# Patient Record
Sex: FEMALE | Race: WHITE | NOT HISPANIC OR LATINO | URBAN - METROPOLITAN AREA
[De-identification: names, ages, dates, MRNs, and addresses within clinical notes are randomized per-mention and may not be internally consistent; named-entity substitution may affect disease eponyms.]

---

## 2023-04-14 ENCOUNTER — TELEMEDICINE (OUTPATIENT)
Dept: PEDIATRICS | Facility: CLINIC | Age: 18
End: 2023-04-14
Payer: COMMERCIAL

## 2023-04-14 DIAGNOSIS — J01.90 ACUTE NON-RECURRENT SINUSITIS, UNSPECIFIED LOCATION: Primary | ICD-10-CM

## 2023-04-14 PROCEDURE — 99213 OFFICE O/P EST LOW 20 MIN: CPT | Performed by: PEDIATRICS

## 2023-04-14 RX ORDER — AMOXICILLIN AND CLAVULANATE POTASSIUM 875; 125 MG/1; MG/1
1 TABLET, FILM COATED ORAL 2 TIMES DAILY
Qty: 20 TABLET | Refills: 0 | Status: SHIPPED | OUTPATIENT
Start: 2023-04-14 | End: 2023-04-24

## 2023-04-14 ASSESSMENT — ENCOUNTER SYMPTOMS
VOMITING: 1
RHINORRHEA: 1
COUGH: 1
SINUS PAIN: 1
DIARRHEA: 0
HEADACHES: 1
SORE THROAT: 1

## 2023-04-14 NOTE — PROGRESS NOTES
Subjective   Yashira Ruiz is a 17 y.o. female who presents for URI and  Cough.    Virtual Visit.    URI   This is a chronic problem. Episode onset: started 2 weeks ago - she thought it was allergies at first. The problem has been rapidly worsening. Maximum temperature: not measured but her pjs were wet this am. Associated symptoms include chest pain, congestion, coughing, ear pain, headaches, rhinorrhea, sinus pain, sneezing, a sore throat and vomiting. Pertinent negatives include no diarrhea. Associated symptoms comments: +yellow nasal discharge.. She has tried acetaminophen and NSAIDs for the symptoms. The treatment provided mild relief.       Objective     There were no vitals taken for this visit.    Physical Exam  Constitutional:       Comments: She actually looks pretty pathetic   HENT:      Head: Normocephalic.      Nose: Nose normal.      Mouth/Throat:      Mouth: Mucous membranes are moist.      Pharynx: No oropharyngeal exudate or posterior oropharyngeal erythema.   Pulmonary:      Effort: Pulmonary effort is normal.   Musculoskeletal:      Cervical back: Normal range of motion.   Neurological:      Mental Status: She is alert and oriented to person, place, and time.   Psychiatric:         Mood and Affect: Mood normal.         Yashira was seen today for cough.  Diagnoses and all orders for this visit:  Acute non-recurrent sinusitis, unspecified location (Primary)  -     amoxicillin-pot clavulanate (Augmentin) 875-125 mg tablet; Take 1 tablet (875 mg) by mouth in the morning and 1 tablet (875 mg) before bedtime. Do all this for 10 days.  Yashira meets the time and symptom criteria for acute sinusitis.  She should take abx as directed.  If she gets worse she needs to be seen in person.  Push fluids, get rest.   An interactive audio and video telecommunication system which permits real time communication between the patient (at the originating site) and provider (at the distant site) was utilized to provide  this telehealth service.

## 2023-06-29 VITALS
HEIGHT: 65 IN | WEIGHT: 112.4 LBS | SYSTOLIC BLOOD PRESSURE: 107 MMHG | BODY MASS INDEX: 18.73 KG/M2 | HEART RATE: 67 BPM | DIASTOLIC BLOOD PRESSURE: 73 MMHG

## 2023-06-29 PROBLEM — F32.A DEPRESSIVE DISORDER: Status: ACTIVE | Noted: 2022-01-04

## 2023-06-29 PROBLEM — Z91.09 ALLERGY TO ENVIRONMENTAL FACTORS: Status: ACTIVE | Noted: 2018-07-12

## 2023-06-29 PROBLEM — B37.31 CANDIDIASIS OF VAGINA: Status: ACTIVE | Noted: 2022-01-04

## 2023-06-29 PROBLEM — F41.1 GENERALIZED ANXIETY DISORDER: Status: ACTIVE | Noted: 2022-01-04

## 2023-06-29 PROBLEM — N39.0 URINARY TRACT INFECTIOUS DISEASE: Status: ACTIVE | Noted: 2022-06-09

## 2023-06-29 PROBLEM — E55.9 VITAMIN D DEFICIENCY: Status: ACTIVE | Noted: 2020-12-09

## 2023-06-29 PROBLEM — H52.31 ANISOMETROPIA: Status: ACTIVE | Noted: 2019-06-19

## 2023-06-29 RX ORDER — FLUOXETINE 20 MG/1
20 TABLET ORAL DAILY
COMMUNITY
End: 2023-07-06 | Stop reason: ALTCHOICE

## 2023-07-03 ENCOUNTER — APPOINTMENT (OUTPATIENT)
Dept: PEDIATRICS | Facility: CLINIC | Age: 18
End: 2023-07-03
Payer: COMMERCIAL

## 2023-07-05 NOTE — PROGRESS NOTES
"Subjective   History was provided by Yashira.  Yashira Ruiz is a 17 y.o. female who is here for this well visit.      Current Issues:  Current concerns: gets abdominal pain after AM [facility] work out.  Sometimes has D.    Vision or hearing concerns? no  Dental care up to date? Yes- brushes teeth 2 times/day , regular dental visits , does floss teeth   No significant recent health issues.   No Specialist visits.      Review of Nutrition, Elimination, and Sleep:  Current diet:  3 meals/day , well balanced diet , normal portions , <8oz. sugar containing beverages daily , appropriate dairy intake , diet includes fruits and vegetables and protein.  Elimination: normal bowel movement frequency, normal consistency   Sleep: has structured bedtime routine , sleeps through the night , no trouble getting up    School and Social Screening:  School: [employer]  Work: at [employer] course this summer  Activities: [facility]  Supportive social network. Current relationship - none.     Sports Participation Screening:  Gets regular exercise.    Screening Questions:  Other: normal mood, satisfied with body weight  Risk factors for dyslipidemia: no  Risk factors for sexually-transmitted infections:   Sexually active: yes - not currently    Using condoms: Yes  Substance use:  Smoking - No  Vaping - No  Drinking - No  Drugs - No  Genitourinary:  normal menses - no issues. Was on ocp (some nausea but no other issues) but it ran out early June. + has gotten period.     Objective   /78 (BP Location: Left arm, Patient Position: Sitting, BP Cuff Size: Adult)   Pulse 68   Ht 1.62 m (5' 3.78\")   Wt 54.3 kg (119 lb 12.8 oz)   BMI 20.71 kg/m²   Growth parameters are noted and are appropriate for age.    Physical Exam  Constitutional:       Appearance: Normal appearance.   HENT:      Right Ear: Tympanic membrane normal.      Left Ear: Tympanic membrane normal.      Nose: Nose normal.      Mouth/Throat:      Mouth: Mucous membranes are " moist.      Pharynx: Oropharynx is clear.   Eyes:      Extraocular Movements: Extraocular movements intact.   Cardiovascular:      Rate and Rhythm: Normal rate and regular rhythm.      Pulses:           Femoral pulses are 2+ on the right side and 2+ on the left side.     Heart sounds: No murmur heard.  Pulmonary:      Effort: Pulmonary effort is normal.      Breath sounds: Normal breath sounds.   Chest:   Breasts:     Edward Score is 5.      Breasts are symmetrical.   Abdominal:      General: Abdomen is flat.      Palpations: Abdomen is soft. There is no hepatomegaly, splenomegaly or mass.   Genitourinary:     Comments: Pt declines exam  Musculoskeletal:         General: Normal range of motion.      Cervical back: Normal range of motion and neck supple.      Thoracic back: No scoliosis.      Lumbar back: No scoliosis.   Lymphadenopathy:      Cervical: No cervical adenopathy.   Skin:     General: Skin is warm.      Findings: No acne.   Neurological:      General: No focal deficit present.      Mental Status: She is alert.      Deep Tendon Reflexes:      Reflex Scores:       Patellar reflexes are 2+ on the right side and 2+ on the left side.  Psychiatric:         Mood and Affect: Mood normal.         Behavior: Behavior normal.         Assessment/Plan   Yashira was seen today for well child.  Diagnoses and all orders for this visit:  Wellness examination (Primary)  -     norgestimate-ethinyl estradioL (Ortho Tri-Cyclen,Trinessa) 0.18/0.215/0.25 mg-35 mcg (28) tablet; Take 1 tablet by mouth once daily.  Lipid screening  -     Lipid Panel; Future  Screening procedure  -     C. Trachomatis / N. Gonorrhoeae, Amplified Detection  Other orders  -     1 Year Follow Up In Pediatrics; Future    Well young adult.  - Anticipatory guidance discussed.   - Injury prevention: wearing seatbelt , understanding sun protection , understanding conflict resolution/violence prevention,  reviewed driving safety    -Risk Taking: cardiac risk  factors reviewed , alcohol, drug and tobacco use reviewed , reviewed internet safety      - Growth and weight gain appropriate. The patient was counseled regarding nutrition and physical activity.  - Development: appropriate for age  - No Immunizations today.  -I will contact Sports Med in regards to abdominal pain.   - Follow up in 1 year for next well child exam or sooner with concerns.

## 2023-07-06 ENCOUNTER — LAB (OUTPATIENT)
Dept: LAB | Facility: LAB | Age: 18
End: 2023-07-06
Payer: COMMERCIAL

## 2023-07-06 ENCOUNTER — OFFICE VISIT (OUTPATIENT)
Dept: PEDIATRICS | Facility: CLINIC | Age: 18
End: 2023-07-06
Payer: COMMERCIAL

## 2023-07-06 VITALS
BODY MASS INDEX: 20.45 KG/M2 | HEIGHT: 64 IN | SYSTOLIC BLOOD PRESSURE: 113 MMHG | WEIGHT: 119.8 LBS | HEART RATE: 68 BPM | DIASTOLIC BLOOD PRESSURE: 78 MMHG

## 2023-07-06 DIAGNOSIS — Z13.220 LIPID SCREENING: ICD-10-CM

## 2023-07-06 DIAGNOSIS — Z00.00 WELLNESS EXAMINATION: Primary | ICD-10-CM

## 2023-07-06 DIAGNOSIS — Z13.9 SCREENING PROCEDURE: ICD-10-CM

## 2023-07-06 PROBLEM — B37.31 CANDIDIASIS OF VAGINA: Status: RESOLVED | Noted: 2022-01-04 | Resolved: 2023-07-06

## 2023-07-06 PROBLEM — N39.0 URINARY TRACT INFECTIOUS DISEASE: Status: RESOLVED | Noted: 2022-06-09 | Resolved: 2023-07-06

## 2023-07-06 LAB
CHOLESTEROL (MG/DL) IN SER/PLAS: 139 MG/DL (ref 0–199)
CHOLESTEROL IN HDL (MG/DL) IN SER/PLAS: 54.9 MG/DL
CHOLESTEROL/HDL RATIO: 2.5
LDL: 76 MG/DL (ref 0–109)
NON HDL CHOLESTEROL: 84 MG/DL (ref 0–119)
TRIGLYCERIDE (MG/DL) IN SER/PLAS: 43 MG/DL (ref 0–149)
VLDL: 9 MG/DL (ref 0–40)

## 2023-07-06 PROCEDURE — 80061 LIPID PANEL: CPT

## 2023-07-06 PROCEDURE — 99395 PREV VISIT EST AGE 18-39: CPT | Performed by: PEDIATRICS

## 2023-07-06 PROCEDURE — 87491 CHLMYD TRACH DNA AMP PROBE: CPT

## 2023-07-06 PROCEDURE — 87591 N.GONORRHOEAE DNA AMP PROB: CPT

## 2023-07-06 PROCEDURE — 3008F BODY MASS INDEX DOCD: CPT | Performed by: PEDIATRICS

## 2023-07-06 PROCEDURE — 96127 BRIEF EMOTIONAL/BEHAV ASSMT: CPT | Performed by: PEDIATRICS

## 2023-07-06 PROCEDURE — 1036F TOBACCO NON-USER: CPT | Performed by: PEDIATRICS

## 2023-07-06 PROCEDURE — 36415 COLL VENOUS BLD VENIPUNCTURE: CPT

## 2023-07-06 RX ORDER — NORGESTIMATE AND ETHINYL ESTRADIOL 7DAYSX3 28
1 KIT ORAL DAILY
Qty: 84 TABLET | Refills: 3 | Status: SHIPPED | OUTPATIENT
Start: 2023-07-06 | End: 2024-01-20 | Stop reason: ALTCHOICE

## 2023-07-06 RX ORDER — NORGESTIMATE AND ETHINYL ESTRADIOL 7DAYSX3 28
1 KIT ORAL DAILY
COMMUNITY
Start: 2023-05-04 | End: 2023-07-06 | Stop reason: SDUPTHER

## 2023-07-07 LAB
CHLAMYDIA TRACH., AMPLIFIED: NEGATIVE
N. GONORRHEA, AMPLIFIED: NEGATIVE

## 2023-07-10 ENCOUNTER — TELEPHONE (OUTPATIENT)
Dept: PEDIATRICS | Facility: CLINIC | Age: 18
End: 2023-07-10
Payer: COMMERCIAL

## 2023-07-10 DIAGNOSIS — R52 PAIN AGGRAVATED BY EXERCISE: Primary | ICD-10-CM

## 2023-07-10 NOTE — TELEPHONE ENCOUNTER
What Yashira eats or drinks doesn't impact her symptoms after exercise.  Will refer to Sports Med.

## 2024-01-11 ENCOUNTER — TELEPHONE (OUTPATIENT)
Dept: PEDIATRICS | Facility: CLINIC | Age: 19
End: 2024-01-11
Payer: COMMERCIAL

## 2024-01-12 NOTE — TELEPHONE ENCOUNTER
Nausea started this AM and threw up between 8 and 9.  Has vomited 4 times since then.  No D.  No F.  Last vomit 1 hour ago. Small UO 30 min ago.  Had appendix out last summer.    Guidelines:  Vomiting due to an infection usually lasts 4 to 12 hours.  Offer 5 ml of clear fluid every 10 - 15 minutes.  For each hour they don't vomit the amount of fluid offered can be increased.  Once they haven't vomited for 4 hours the amount of fluids can be on demand - continue to offer regularly.  When they are tolerating full fluids and are hungry, bland solids can be offered.

## 2024-01-16 ENCOUNTER — TELEPHONE (OUTPATIENT)
Dept: PEDIATRICS | Facility: CLINIC | Age: 19
End: 2024-01-16
Payer: COMMERCIAL

## 2024-01-16 DIAGNOSIS — R10.2 PELVIC PAIN: Primary | ICD-10-CM

## 2024-01-16 NOTE — TELEPHONE ENCOUNTER
Per mom, was seen in ER for pelvic pain, put on antibiotics, was told to see gyne.  Needs referral.

## 2024-01-18 PROBLEM — S86.319A STRAIN OF PERONEAL TENDON: Status: ACTIVE | Noted: 2024-01-18

## 2024-01-18 PROBLEM — Q52.10 VAGINAL SEPTUM: Status: ACTIVE | Noted: 2024-01-18

## 2024-01-18 PROBLEM — S93.409A ANKLE SPRAIN: Status: ACTIVE | Noted: 2024-01-18

## 2024-01-18 PROBLEM — K35.80 APPENDICITIS, ACUTE: Status: ACTIVE | Noted: 2024-01-18

## 2024-01-18 RX ORDER — IBUPROFEN 400 MG/1
TABLET ORAL
COMMUNITY
End: 2024-01-20 | Stop reason: ALTCHOICE

## 2024-01-18 RX ORDER — LIDOCAINE HYDROCHLORIDE 20 MG/ML
SOLUTION OROPHARYNGEAL
COMMUNITY
Start: 2022-12-11 | End: 2024-03-11 | Stop reason: WASHOUT

## 2024-01-18 RX ORDER — ACETAMINOPHEN 325 MG/1
TABLET ORAL
COMMUNITY
Start: 2023-07-14 | End: 2024-01-20 | Stop reason: ALTCHOICE

## 2024-01-18 RX ORDER — AMOXICILLIN AND CLAVULANATE POTASSIUM 875; 125 MG/1; MG/1
TABLET, FILM COATED ORAL
COMMUNITY
Start: 2023-04-14 | End: 2024-01-20 | Stop reason: ALTCHOICE

## 2024-01-18 RX ORDER — DOXYCYCLINE HYCLATE 100 MG
100 TABLET ORAL 2 TIMES DAILY
COMMUNITY
Start: 2024-01-12 | End: 2024-01-26

## 2024-01-18 RX ORDER — OXYCODONE HYDROCHLORIDE 5 MG/1
5 TABLET ORAL EVERY 6 HOURS PRN
COMMUNITY
Start: 2023-07-16 | End: 2024-01-20 | Stop reason: ALTCHOICE

## 2024-01-18 RX ORDER — ONDANSETRON 8 MG/1
8 TABLET, ORALLY DISINTEGRATING ORAL EVERY 8 HOURS PRN
COMMUNITY
Start: 2024-01-12 | End: 2024-03-11 | Stop reason: WASHOUT

## 2024-01-20 ENCOUNTER — OFFICE VISIT (OUTPATIENT)
Dept: PEDIATRICS | Facility: CLINIC | Age: 19
End: 2024-01-20
Payer: COMMERCIAL

## 2024-01-20 VITALS — BODY MASS INDEX: 19.55 KG/M2 | WEIGHT: 113.8 LBS | TEMPERATURE: 97.7 F

## 2024-01-20 DIAGNOSIS — R10.30 LOWER ABDOMINAL PAIN: Primary | ICD-10-CM

## 2024-01-20 LAB
POC APPEARANCE, URINE: CLEAR
POC BILIRUBIN, URINE: NEGATIVE
POC BLOOD, URINE: NEGATIVE
POC COLOR, URINE: YELLOW
POC GLUCOSE, URINE: NEGATIVE MG/DL
POC KETONES, URINE: NEGATIVE MG/DL
POC LEUKOCYTES, URINE: NEGATIVE
POC NITRITE,URINE: NEGATIVE
POC PH, URINE: 5 PH
POC PROTEIN, URINE: NEGATIVE MG/DL
POC SPECIFIC GRAVITY, URINE: >=1.03
POC UROBILINOGEN, URINE: 1 EU/DL

## 2024-01-20 PROCEDURE — 81002 URINALYSIS NONAUTO W/O SCOPE: CPT | Performed by: PEDIATRICS

## 2024-01-20 PROCEDURE — 1036F TOBACCO NON-USER: CPT | Performed by: PEDIATRICS

## 2024-01-20 PROCEDURE — 99214 OFFICE O/P EST MOD 30 MIN: CPT | Performed by: PEDIATRICS

## 2024-01-20 ASSESSMENT — ENCOUNTER SYMPTOMS
DIARRHEA: 1
ABDOMINAL PAIN: 1
DYSURIA: 0
VOMITING: 0
CONSTIPATION: 0
NAUSEA: 1
FEVER: 0
ANOREXIA: 1

## 2024-01-20 NOTE — PROGRESS NOTES
Subjective   Yashira Ruiz is a 18 y.o. female who presents for Vomiting (Vomiting/Fever/Was diagnosed with inflamed cervix/pt stated antibiotics are not working/Here by herself).  Today she is accompanied by caregiver who is also providing history.    Abdominal Pain  This is a new problem. Episode onset: 9 days ago started with vomiting. The onset quality is sudden. Episode frequency: still with lower abdominal pain and urinary urgency and lower but no dysuria. The pain is located in the suprapubic region. Associated symptoms include anorexia, diarrhea and nausea. Pertinent negatives include no constipation, dysuria, fever (lasted 2 days) or vomiting (resolved 2-3 d ago). (Urinary urgency but doesn't go every time; no vaginal discharge the entire time) Nothing relieves the symptoms. Past treatments include nothing (immodium). The treatment provided mild relief. (Procedure history: the ER talked about doing an imaging procedure but didn't) Chronic gastrointestinal disease: Seen in ER 8 days ago.  Ultimately diagnosed with PID and rec'd ceftriaxone iv and doxy.  Developed swollen hands and feet 5 hours later..       Objective     Temp 36.5 °C (97.7 °F) (Tympanic)   Wt 51.6 kg (113 lb 12.8 oz)   BMI 19.55 kg/m²     Physical Exam  Vitals reviewed.   Constitutional:       Appearance: Normal appearance.   HENT:      Right Ear: Tympanic membrane normal.      Left Ear: Tympanic membrane normal.      Nose: Nose normal.      Mouth/Throat:      Mouth: Mucous membranes are moist.   Eyes:      Conjunctiva/sclera: Conjunctivae normal.   Cardiovascular:      Rate and Rhythm: Normal rate and regular rhythm.      Heart sounds: Normal heart sounds.   Pulmonary:      Effort: Pulmonary effort is normal.      Breath sounds: Normal breath sounds.   Abdominal:      General: Abdomen is flat. Bowel sounds are normal.      Palpations: Abdomen is soft. There is no mass.          Comments: Moderately tender in central lower area    Musculoskeletal:      Cervical back: Normal range of motion.   Skin:     General: Skin is warm.      Findings: No rash.   Neurological:      Mental Status: She is alert and oriented to person, place, and time.   Psychiatric:         Mood and Affect: Mood normal.         Assessment/Plan   Yashira was seen today for vomiting.  Diagnoses and all orders for this visit:  Lower abdominal pain (Primary)  -     POCT UA (nonautomated) manually resulted  -     Cancel: US pelvis; Future  -     US pelvis; Future  What seems most likely at this point is that Yashira had a significant AGE and that ongoing symptoms are related to antibiotics.  She has not had intercourse since September and had no symptoms and was negative for gc and CT this summer and labs came back negative from the ER.  This makes PID unlikely.  She clearly had a reaction to the ceftriaxone.  Since starting the antibiotics she has had diarrhea.  The current plan is to stop the antibiotics, increase fluids, and get an US in [place].  She should also set up a gyn appointment.  Obviously if she gets worse in any way she should be seen.

## 2024-01-21 ENCOUNTER — HOSPITAL ENCOUNTER (OUTPATIENT)
Dept: RADIOLOGY | Facility: HOSPITAL | Age: 19
Discharge: HOME | End: 2024-01-21
Payer: COMMERCIAL

## 2024-01-21 DIAGNOSIS — R10.30 LOWER ABDOMINAL PAIN: ICD-10-CM

## 2024-01-21 PROCEDURE — 76856 US EXAM PELVIC COMPLETE: CPT

## 2024-01-21 PROCEDURE — 76856 US EXAM PELVIC COMPLETE: CPT | Performed by: STUDENT IN AN ORGANIZED HEALTH CARE EDUCATION/TRAINING PROGRAM

## 2024-01-21 PROCEDURE — 76830 TRANSVAGINAL US NON-OB: CPT | Performed by: STUDENT IN AN ORGANIZED HEALTH CARE EDUCATION/TRAINING PROGRAM

## 2024-01-23 ENCOUNTER — TELEPHONE (OUTPATIENT)
Dept: OBSTETRICS AND GYNECOLOGY | Facility: CLINIC | Age: 19
End: 2024-01-23
Payer: COMMERCIAL

## 2024-03-11 ENCOUNTER — OFFICE VISIT (OUTPATIENT)
Dept: OBSTETRICS AND GYNECOLOGY | Facility: CLINIC | Age: 19
End: 2024-03-11
Payer: COMMERCIAL

## 2024-03-11 VITALS — SYSTOLIC BLOOD PRESSURE: 100 MMHG | BODY MASS INDEX: 19.75 KG/M2 | DIASTOLIC BLOOD PRESSURE: 58 MMHG | WEIGHT: 115 LBS

## 2024-03-11 DIAGNOSIS — N94.10 DYSPAREUNIA IN FEMALE: ICD-10-CM

## 2024-03-11 DIAGNOSIS — Z30.011 OCP (ORAL CONTRACEPTIVE PILLS) INITIATION: ICD-10-CM

## 2024-03-11 DIAGNOSIS — N94.6 DYSMENORRHEA: Primary | ICD-10-CM

## 2024-03-11 DIAGNOSIS — N94.2 VAGINISMUS: ICD-10-CM

## 2024-03-11 PROCEDURE — 99205 OFFICE O/P NEW HI 60 MIN: CPT | Performed by: OBSTETRICS & GYNECOLOGY

## 2024-03-11 PROCEDURE — 1036F TOBACCO NON-USER: CPT | Performed by: OBSTETRICS & GYNECOLOGY

## 2024-03-11 RX ORDER — NORETHINDRONE ACETATE AND ETHINYL ESTRADIOL, ETHINYL ESTRADIOL AND FERROUS FUMARATE 1MG-10(24)
1 KIT ORAL DAILY
Qty: 28 TABLET | Refills: 2 | COMMUNITY
Start: 2024-03-11

## 2024-03-11 ASSESSMENT — ENCOUNTER SYMPTOMS
NAUSEA: 1
DIARRHEA: 1
BACK PAIN: 1
FATIGUE: 1
SLEEP DISTURBANCE: 1
ABDOMINAL PAIN: 1

## 2024-03-11 NOTE — PROGRESS NOTES
Subjective   Patient ID: Yashira Ruiz is a 18 y.o. female who presents for New Patient Visit.  HPI  Menarche was age 10.  Regular menstrual cycles since then.  First couple days of her period are a little heavier, then light to moderate flow.  Says she gets really bad back pain during periods. Sometimes to the point where she has missed school or activities. She uses a heating pad, Ibuprofen which help a little.  Loose stools/diarrhea when on her period.    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    Has pain with sex. Has always been painful, not new. Pain every time in the vagina and in the lower abdomen.     In January she had the sudden onset of abdominal pain, nausea and vomiting, fever, low back pain. Abdominal pain across lower abdomen, a little worse on the right.  She went to the ED 1/12/24. ED notes indicate she had lower abdominal pain and cervical motion tenderness. STD testing was done. No imaging. She was treated empirically for PID with Cetriaxone and Doxycycline.  That night got swelling and rash, so pediatrician thinks she had allergic reaction to the Ceftriaxone.    STD testing for gonorrhea and chlamydia done in the ED came back negative.    She saw her pediatrician 1/20/24 for follow up. Still lower abdominal pain, nausea, diarrhea.   Pelvic ultrasound was ordered and done 1/21/2024.  Normal findings.  Uterus 8.5 x 2.9 x 3.5 cm.  Ovaries normal.  Right ovary follicles noted.    She says that the nausea and vomiting, and abdominal pain that she was having in January have resolved.  Most recent menstrual cycle was just as painful if not worse than before.    Tried a birth control pill 2 or 3 years ago for contraception.  Stopped using it because after running in the morning she was nauseous and having the stomach problems.  However, even after stopping the pill the symptoms were still there.  She gets symptoms still of nausea and stomach pain off-and-on, especially after she is  running/exercising.    Diarrhea on period, but most of the time bowels are regular.    Sexually active, using condoms.  Denies history of STI.    She is a student in [place].  A sophomore.    Review of Systems   Constitutional:  Positive for fatigue.   Gastrointestinal:  Positive for abdominal pain, diarrhea and nausea.   Genitourinary:  Positive for dyspareunia and pelvic pain.   Musculoskeletal:  Positive for back pain.   Psychiatric/Behavioral:  Positive for sleep disturbance.    All other systems reviewed and are negative.      Objective   Physical Exam  Constitutional: Alert and in no acute distress.     Pulmonary: No respiratory distress.     Abdomen: Soft, nontender; no abdominal mass palpated.     Genitourinary:   External genitalia: Normal appearance.  No inguinal lymphadenopathy.   Bartholin's, Urethral, and Skenes Glands: Normal.   Urethra: Normal. Bladder: Normal on palpation.   Vagina: Normal appearance of the vaginal tissue.  No vaginal discharge.  However, significant pain with palpation of the vaginal pelvic muscles.  As soon as a finger veered to the vaginal opening, the patient visibly tensed her pelvic and gluteal muscles.  With the patient she had significant pain of the vaginal sidewall muscles and posterior vaginal tissue toward the rectum.  Cervix: Normal appearance, no specific cervical motion tenderness  Uterus/adnexa: Discomfort with bimanual exam all over the pelvis.  Patient noted it was worse on the left side.  She tensed up significantly during the bimanual exam.  No mass was palpable in the adnexa.    Inspection of perianal area: Normal.    Musculoskeletal: No joint swelling seen, normal movements of all extremities.    Skin: Normal skin color and pigmentation, normal skin turgor, and no rash.    Psychiatric: Alert and oriented x 3. Affect normal to patient's baseline. Mood: Appropriate.    Assessment/Plan   Diagnoses and all orders for this visit:  Dysmenorrhea  Dyspareunia in  female  Vaginismus  OCP (oral contraceptive pills) initiation  -     norethindrone-e.estradioL-iron (Lo Loestrin Fe) 1 mg-10 mcg (24)/10 mcg (2) tablet; Take 1 tablet by mouth once daily.     The patient is seen today for a gynecology problem visit.  We had a long discussion regarding her GYN/menstrual history.  Medical records from her pediatrician notes and ED records from her visit in January were reviewed.    The episode of nausea/vomiting and abdominal pain that she had in January has resolved.  We discussed possible reasons for why this set of symptoms may have occurred.  I do not believe that she truly had pelvic inflammatory disease.  First, because her STD screening came back negative.  Second, she had no vaginal symptoms at the time of this diagnosis.  Third, sudden onset of the symptoms; PID would have likely been more gradual.  I believe the patient's vaginal pelvic floor pain was misread as cervical motion tenderness.  A possible GYN source of the symptoms may have been an ovarian cyst rupture.  That would be somewhat consistent with the symptoms that she had occur.  Subsequent ultrasound did show small cystic follicles on the right ovary.  Other possible causes of the pain could have been  gastrointestinal or urinary.    The main chronic issues that she has all her painful periods and painful intercourse.  We discussed some possible reasons for the painful periods.  This could include uterine or ovarian problems.  She also has some symptoms which are consistent with possible endometriosis.  We discussed possible reasons for the pain with intercourse.  Her pelvic exam findings are very consistent with vaginismus.  I explained to her what this is and gave her specific information on vaginismus.  We discussed that she has significant tightening of her pelvic floor muscles which are likely causing her pain.  Including the information were ways to help with this pain.  Advised her to try using tampons or  dilators to help any more comfortable with something inserted in the vagina.  Try lubricants or moisturizers.  I think she may benefit from pelvic floor physical therapy.  She states that she would like to discuss this with her mother first.  If interested should let me know.  If she does have something like endometriosis occurring this could also be a reason for her painful intercourse.    In addition to reviewing the treatments for vaginismus, we starting something for treatment of her painful menstrual cycles and to help possibly stop return of ovarian cyst.  I would recommend starting her on a birth control pill.  She agreed to this.  I gave her samples of low Loestrin to begin with her next menstrual cycle.  I would like her to trial this for at least 3 months to see if she notices any improvement in symptoms.  If she is doing well, she should let me know and we will continue the medication.  If she notices no improvement or symptoms worsen, she should let me know this too and we will discuss alternate treatment options.  I would like her to see me for follow-up in 3 to 6 months.    Yen Head MD 03/11/24 2:17 PM

## 2024-06-28 ENCOUNTER — LAB (OUTPATIENT)
Dept: LAB | Facility: LAB | Age: 19
End: 2024-06-28
Payer: COMMERCIAL

## 2024-06-28 ENCOUNTER — OFFICE VISIT (OUTPATIENT)
Dept: PEDIATRICS | Facility: CLINIC | Age: 19
End: 2024-06-28
Payer: COMMERCIAL

## 2024-06-28 VITALS — BODY MASS INDEX: 20.13 KG/M2 | TEMPERATURE: 98.1 F | WEIGHT: 117.2 LBS

## 2024-06-28 DIAGNOSIS — L24.9 IRRITANT CONTACT DERMATITIS, UNSPECIFIED TRIGGER: ICD-10-CM

## 2024-06-28 DIAGNOSIS — B35.3 TINEA PEDIS OF BOTH FEET: ICD-10-CM

## 2024-06-28 DIAGNOSIS — R53.83 OTHER FATIGUE: ICD-10-CM

## 2024-06-28 DIAGNOSIS — R30.9 URINATION PAIN: Primary | ICD-10-CM

## 2024-06-28 LAB
BASOPHILS # BLD AUTO: 0.05 X10*3/UL (ref 0–0.1)
BASOPHILS NFR BLD AUTO: 0.9 %
EOSINOPHIL # BLD AUTO: 0.13 X10*3/UL (ref 0–0.7)
EOSINOPHIL NFR BLD AUTO: 2.4 %
ERYTHROCYTE [DISTWIDTH] IN BLOOD BY AUTOMATED COUNT: 13.8 % (ref 11.5–14.5)
ERYTHROCYTE [SEDIMENTATION RATE] IN BLOOD BY WESTERGREN METHOD: 1 MM/H (ref 0–20)
HCT VFR BLD AUTO: 39.4 % (ref 36–46)
HGB BLD-MCNC: 12.3 G/DL (ref 12–16)
IMM GRANULOCYTES # BLD AUTO: 0.01 X10*3/UL (ref 0–0.7)
IMM GRANULOCYTES NFR BLD AUTO: 0.2 % (ref 0–0.9)
LYMPHOCYTES # BLD AUTO: 1.72 X10*3/UL (ref 1.2–4.8)
LYMPHOCYTES NFR BLD AUTO: 32.3 %
MCH RBC QN AUTO: 29.5 PG (ref 26–34)
MCHC RBC AUTO-ENTMCNC: 31.2 G/DL (ref 32–36)
MCV RBC AUTO: 95 FL (ref 80–100)
MONOCYTES # BLD AUTO: 0.47 X10*3/UL (ref 0.1–1)
MONOCYTES NFR BLD AUTO: 8.8 %
NEUTROPHILS # BLD AUTO: 2.94 X10*3/UL (ref 1.2–7.7)
NEUTROPHILS NFR BLD AUTO: 55.4 %
NRBC BLD-RTO: 0 /100 WBCS (ref 0–0)
PLATELET # BLD AUTO: 240 X10*3/UL (ref 150–450)
POC APPEARANCE, URINE: CLEAR
POC BILIRUBIN, URINE: NEGATIVE
POC BLOOD, URINE: ABNORMAL
POC COLOR, URINE: YELLOW
POC GLUCOSE, URINE: NEGATIVE MG/DL
POC KETONES, URINE: NEGATIVE MG/DL
POC LEUKOCYTES, URINE: NEGATIVE
POC NITRITE,URINE: NEGATIVE
POC PH, URINE: 7 PH
POC PROTEIN, URINE: NEGATIVE MG/DL
POC SPECIFIC GRAVITY, URINE: 1.02
POC UROBILINOGEN, URINE: 0.2 EU/DL
RBC # BLD AUTO: 4.17 X10*6/UL (ref 4–5.2)
TSH SERPL-ACNC: 3.44 MIU/L (ref 0.44–3.98)
WBC # BLD AUTO: 5.3 X10*3/UL (ref 4.4–11.3)

## 2024-06-28 PROCEDURE — 84439 ASSAY OF FREE THYROXINE: CPT

## 2024-06-28 PROCEDURE — 99214 OFFICE O/P EST MOD 30 MIN: CPT | Performed by: PEDIATRICS

## 2024-06-28 PROCEDURE — 85025 COMPLETE CBC W/AUTO DIFF WBC: CPT

## 2024-06-28 PROCEDURE — 36415 COLL VENOUS BLD VENIPUNCTURE: CPT

## 2024-06-28 PROCEDURE — 84443 ASSAY THYROID STIM HORMONE: CPT

## 2024-06-28 PROCEDURE — 81003 URINALYSIS AUTO W/O SCOPE: CPT | Performed by: PEDIATRICS

## 2024-06-28 PROCEDURE — 85652 RBC SED RATE AUTOMATED: CPT

## 2024-06-28 PROCEDURE — 86038 ANTINUCLEAR ANTIBODIES: CPT

## 2024-06-29 DIAGNOSIS — R53.83 OTHER FATIGUE: Primary | ICD-10-CM

## 2024-06-29 LAB — T4 FREE SERPL-MCNC: 1.01 NG/DL (ref 0.78–1.48)

## 2024-06-29 RX ORDER — CLOTRIMAZOLE AND BETAMETHASONE DIPROPIONATE 10; .64 MG/G; MG/G
1 CREAM TOPICAL 2 TIMES DAILY
Qty: 45 G | Refills: 1 | Status: SHIPPED | OUTPATIENT
Start: 2024-06-29 | End: 2024-07-27

## 2024-07-02 LAB — ANA SER QL HEP2 SUBST: NEGATIVE

## 2024-07-26 ENCOUNTER — APPOINTMENT (OUTPATIENT)
Dept: PEDIATRICS | Facility: CLINIC | Age: 19
End: 2024-07-26
Payer: COMMERCIAL

## 2024-08-12 PROBLEM — M76.62 ACHILLES TENDINITIS OF LEFT LOWER EXTREMITY: Status: ACTIVE | Noted: 2024-08-12

## 2024-08-12 PROBLEM — M25.579 ANKLE PAIN: Status: ACTIVE | Noted: 2024-08-12

## 2024-08-12 PROBLEM — M25.473 ANKLE EDEMA: Status: ACTIVE | Noted: 2024-08-12

## 2024-08-12 PROBLEM — M79.672 LEFT FOOT PAIN: Status: ACTIVE | Noted: 2024-08-12

## 2024-08-14 ENCOUNTER — APPOINTMENT (OUTPATIENT)
Dept: PEDIATRICS | Facility: CLINIC | Age: 19
End: 2024-08-14
Payer: COMMERCIAL

## 2024-08-14 VITALS
SYSTOLIC BLOOD PRESSURE: 106 MMHG | WEIGHT: 115.6 LBS | DIASTOLIC BLOOD PRESSURE: 66 MMHG | HEART RATE: 84 BPM | BODY MASS INDEX: 19.74 KG/M2 | HEIGHT: 64 IN

## 2024-08-14 DIAGNOSIS — Z00.00 WELLNESS EXAMINATION: Primary | ICD-10-CM

## 2024-08-14 DIAGNOSIS — Z13.9 SCREENING PROCEDURE: ICD-10-CM

## 2024-08-14 DIAGNOSIS — N94.6 DYSMENORRHEA: ICD-10-CM

## 2024-08-14 DIAGNOSIS — Z23 NEED FOR VACCINATION: ICD-10-CM

## 2024-08-14 DIAGNOSIS — M76.62 ACHILLES TENDINITIS OF LEFT LOWER EXTREMITY: ICD-10-CM

## 2024-08-14 PROBLEM — M25.579 ANKLE PAIN: Status: RESOLVED | Noted: 2024-08-12 | Resolved: 2024-08-14

## 2024-08-14 PROBLEM — S93.409A ANKLE SPRAIN: Status: RESOLVED | Noted: 2024-01-18 | Resolved: 2024-08-14

## 2024-08-14 PROBLEM — M79.672 LEFT FOOT PAIN: Status: RESOLVED | Noted: 2024-08-12 | Resolved: 2024-08-14

## 2024-08-14 PROBLEM — M25.473 ANKLE EDEMA: Status: RESOLVED | Noted: 2024-08-12 | Resolved: 2024-08-14

## 2024-08-14 PROBLEM — Q52.10 VAGINAL SEPTUM: Status: RESOLVED | Noted: 2024-01-18 | Resolved: 2024-08-14

## 2024-08-14 PROBLEM — R52 PAIN AGGRAVATED BY EXERCISE: Status: RESOLVED | Noted: 2023-07-10 | Resolved: 2024-08-14

## 2024-08-14 PROCEDURE — 87491 CHLMYD TRACH DNA AMP PROBE: CPT

## 2024-08-14 PROCEDURE — 90715 TDAP VACCINE 7 YRS/> IM: CPT | Performed by: PEDIATRICS

## 2024-08-14 PROCEDURE — 1036F TOBACCO NON-USER: CPT | Performed by: PEDIATRICS

## 2024-08-14 PROCEDURE — 3008F BODY MASS INDEX DOCD: CPT | Performed by: PEDIATRICS

## 2024-08-14 PROCEDURE — 90471 IMMUNIZATION ADMIN: CPT | Performed by: PEDIATRICS

## 2024-08-14 PROCEDURE — 99395 PREV VISIT EST AGE 18-39: CPT | Performed by: PEDIATRICS

## 2024-08-14 PROCEDURE — 87591 N.GONORRHOEAE DNA AMP PROB: CPT

## 2024-08-14 NOTE — PROGRESS NOTES
"Subjective   History was provided by Yashira.  Yashira Ruiz is a 19 y.o. female who is here for this well visit.    Current Issues:  Current concerns: discussed sleep - never wakes feeling fully refreshed; still intermittently gets some abdominal pain; still sometimes gets some left achilles pain  Vision or hearing concerns? no  Dental care up to date? Yes- brushes teeth 2 times/day, regular dental visits, does floss teeth   No significant recent health issues.   Specialist visits - none    Review of Nutrition, Elimination, and Sleep:  Current diet:  3 meals/day, diet well balanced, normal portions, <8oz. sugar containing beverages daily, adequate dairy intake, diet includes fruits and vegetables and protein.  Elimination: normal bowel movement frequency, normal consistency   Sleep: has structured bedtime routine, sleeps through the night    School and Social Screening:  School: [employer]  Work: did [employer] camp this summer in [place]  Supportive social network. Current relationship - yes    Sports Participation Screening:  Gets regular exercise.    Screening Questions:  Other: normal mood, satisfied with body weight  Risk factors for dyslipidemia: no  Risk factors for sexually-transmitted infections:   Sexually active: yes -      Using condoms: Yes  Substance use:  Smoking - No  Vaping - No  Drinking - Yes  Drugs - No  Genitourinary:   normal menses - no issues    Objective   /66   Pulse 84   Ht 1.63 m (5' 4.17\")   Wt 52.4 kg (115 lb 9.6 oz)   BMI 19.74 kg/m²   Growth parameters are noted and are appropriate for age.    Physical Exam  Constitutional:       Appearance: Normal appearance.   HENT:      Right Ear: Tympanic membrane normal.      Left Ear: Tympanic membrane normal.      Nose: Nose normal.      Mouth/Throat:      Mouth: Mucous membranes are moist.      Pharynx: Oropharynx is clear.   Eyes:      Extraocular Movements: Extraocular movements intact.   Cardiovascular:      Rate and Rhythm: Normal " rate and regular rhythm.      Pulses:           Femoral pulses are 2+ on the right side and 2+ on the left side.     Heart sounds: No murmur heard.  Pulmonary:      Effort: Pulmonary effort is normal.      Breath sounds: Normal breath sounds.   Chest:   Breasts:     Edward Score is 5.      Breasts are symmetrical.   Abdominal:      General: Abdomen is flat.      Palpations: Abdomen is soft. There is no hepatomegaly, splenomegaly or mass.   Genitourinary:     Comments: Pt declines exam  Musculoskeletal:         General: Normal range of motion.      Cervical back: Normal range of motion and neck supple.      Thoracic back: No scoliosis.      Lumbar back: No scoliosis.   Lymphadenopathy:      Cervical: No cervical adenopathy.   Skin:     General: Skin is warm.      Findings: No acne.   Neurological:      General: No focal deficit present.      Mental Status: She is alert.      Deep Tendon Reflexes:      Reflex Scores:       Patellar reflexes are 2+ on the right side and 2+ on the left side.  Psychiatric:         Mood and Affect: Mood normal.         Behavior: Behavior normal.         Assessment/Plan   Yashira was seen today for well child and other.  Diagnoses and all orders for this visit:  Wellness examination (Primary)  Need for vaccination  -     Tdap vaccine, age 7 years and older  Screening procedure  -     C. Trachomatis / N. Gonorrhoeae, Amplified Detection  Achilles tendinitis of left lower extremity  Dysmenorrhea  Other orders  -     Follow Up In Pediatrics - Health Maintenance; Future    Well young adult.  - Anticipatory guidance discussed.   - Injury prevention: wearing seatbelt, understanding sun protection, understanding conflict resolution/violence prevention,  reviewed driving safety    -Risk Taking: cardiac risk factors reviewed, alcohol, drug and tobacco use reviewed, reviewed internet safety      - Growth and weight gain appropriate. The patient was counseled regarding nutrition and physical  activity.  - Development: appropriate for age.  Declined PHQ.  Discussed mood and anxiety and given book name.   - If achilles doesn't improve should see ortho/foot and ankle.  - Get in the habit of monthly breast exams.  See gyn for birth control and dyspareunia.   - Immunizations today: per orders. All vaccines given at today’s visit were reviewed with the family. Risks/benefits/side effects discussed and VIS sheet provided. All questions answered. Given with consent   - Follow up in 1 year for next well child exam or sooner with concerns.    Problem List Items Addressed This Visit       Dysmenorrhea     Referred to gyn         Achilles tendinitis of left lower extremity     Consider ortho - a normal xray doesn't really tell about the achilles          Other Visit Diagnoses       Wellness examination    -  Primary    Need for vaccination        Relevant Orders    Tdap vaccine, age 7 years and older (Completed)    Screening procedure        Relevant Orders    C. Trachomatis / N. Gonorrhoeae, Amplified Detection

## 2024-08-15 LAB
C TRACH RRNA SPEC QL NAA+PROBE: NEGATIVE
N GONORRHOEA DNA SPEC QL PROBE+SIG AMP: NEGATIVE

## 2024-12-10 ENCOUNTER — TELEPHONE (OUTPATIENT)
Dept: OBSTETRICS AND GYNECOLOGY | Facility: CLINIC | Age: 19
End: 2024-12-10
Payer: COMMERCIAL

## 2024-12-10 DIAGNOSIS — N94.6 DYSMENORRHEA: ICD-10-CM

## 2024-12-10 RX ORDER — NORETHINDRONE ACETATE AND ETHINYL ESTRADIOL, ETHINYL ESTRADIOL AND FERROUS FUMARATE 1MG-10(24)
1 KIT ORAL DAILY
Qty: 84 TABLET | Refills: 0 | Status: SHIPPED | OUTPATIENT
Start: 2024-12-10

## 2024-12-10 RX ORDER — NORETHINDRONE ACETATE AND ETHINYL ESTRADIOL, ETHINYL ESTRADIOL AND FERROUS FUMARATE 1MG-10(24)
1 KIT ORAL DAILY
COMMUNITY
End: 2024-12-10 | Stop reason: SDUPTHER

## 2024-12-10 NOTE — TELEPHONE ENCOUNTER
PT CALLED AND LEFT VM ON RX LINE TO GET HER BCP SENT TO [facility] IN [place]. IRENE PT AND SHE WAS SEEN IN 3-11-24 FOR APPT/MP

## 2025-08-04 ENCOUNTER — APPOINTMENT (OUTPATIENT)
Dept: PEDIATRICS | Facility: CLINIC | Age: 20
End: 2025-08-04
Payer: COMMERCIAL

## 2025-08-04 VITALS
DIASTOLIC BLOOD PRESSURE: 62 MMHG | HEIGHT: 65 IN | WEIGHT: 112.6 LBS | HEART RATE: 64 BPM | BODY MASS INDEX: 18.76 KG/M2 | SYSTOLIC BLOOD PRESSURE: 100 MMHG

## 2025-08-04 DIAGNOSIS — N94.2 VAGINISMUS: ICD-10-CM

## 2025-08-04 DIAGNOSIS — Z13.9 SCREENING PROCEDURE: ICD-10-CM

## 2025-08-04 DIAGNOSIS — R10.2 COMPLAINT OF PELVIC PAIN: ICD-10-CM

## 2025-08-04 DIAGNOSIS — Z91.09 ALLERGY TO ENVIRONMENTAL FACTORS: ICD-10-CM

## 2025-08-04 DIAGNOSIS — Z00.00 WELL ADULT EXAM: Primary | ICD-10-CM

## 2025-08-04 PROBLEM — E55.9 VITAMIN D DEFICIENCY: Status: RESOLVED | Noted: 2020-12-09 | Resolved: 2025-08-04

## 2025-08-04 PROBLEM — F32.A DEPRESSIVE DISORDER: Status: RESOLVED | Noted: 2022-01-04 | Resolved: 2025-08-04

## 2025-08-04 PROBLEM — F41.1 GENERALIZED ANXIETY DISORDER: Status: RESOLVED | Noted: 2022-01-04 | Resolved: 2025-08-04

## 2025-08-04 PROBLEM — K35.80 APPENDICITIS, ACUTE: Status: RESOLVED | Noted: 2024-01-18 | Resolved: 2025-08-04

## 2025-08-04 PROCEDURE — 96127 BRIEF EMOTIONAL/BEHAV ASSMT: CPT | Performed by: PEDIATRICS

## 2025-08-04 PROCEDURE — 3008F BODY MASS INDEX DOCD: CPT | Performed by: PEDIATRICS

## 2025-08-04 PROCEDURE — 1036F TOBACCO NON-USER: CPT | Performed by: PEDIATRICS

## 2025-08-04 PROCEDURE — 99395 PREV VISIT EST AGE 18-39: CPT | Performed by: PEDIATRICS

## 2025-08-04 PROCEDURE — 99213 OFFICE O/P EST LOW 20 MIN: CPT | Performed by: PEDIATRICS

## 2025-08-04 RX ORDER — ONDANSETRON 8 MG/1
8 TABLET, ORALLY DISINTEGRATING ORAL EVERY 8 HOURS PRN
COMMUNITY
Start: 2024-01-12

## 2025-08-04 RX ORDER — FLUCONAZOLE 150 MG/1
1 TABLET ORAL
COMMUNITY
Start: 2025-02-14 | End: 2025-08-04 | Stop reason: WASHOUT

## 2025-08-04 ASSESSMENT — PATIENT HEALTH QUESTIONNAIRE - PHQ9
3. TROUBLE FALLING OR STAYING ASLEEP: NOT AT ALL
2. FEELING DOWN, DEPRESSED OR HOPELESS: NOT AT ALL
SUM OF ALL RESPONSES TO PHQ9 QUESTIONS 1 & 2: 0
9. THOUGHTS THAT YOU WOULD BE BETTER OFF DEAD, OR OF HURTING YOURSELF: NOT AT ALL
5. POOR APPETITE OR OVEREATING: NOT AT ALL
8. MOVING OR SPEAKING SO SLOWLY THAT OTHER PEOPLE COULD HAVE NOTICED. OR THE OPPOSITE, BEING SO FIGETY OR RESTLESS THAT YOU HAVE BEEN MOVING AROUND A LOT MORE THAN USUAL: NOT AT ALL
5. POOR APPETITE OR OVEREATING: NOT AT ALL
2. FEELING DOWN, DEPRESSED OR HOPELESS: NOT AT ALL
6. FEELING BAD ABOUT YOURSELF - OR THAT YOU ARE A FAILURE OR HAVE LET YOURSELF OR YOUR FAMILY DOWN: NOT AT ALL
8. MOVING OR SPEAKING SO SLOWLY THAT OTHER PEOPLE COULD HAVE NOTICED. OR THE OPPOSITE - BEING SO FIDGETY OR RESTLESS THAT YOU HAVE BEEN MOVING AROUND A LOT MORE THAN USUAL: NOT AT ALL
10. IF YOU CHECKED OFF ANY PROBLEMS, HOW DIFFICULT HAVE THESE PROBLEMS MADE IT FOR YOU TO DO YOUR WORK, TAKE CARE OF THINGS AT HOME, OR GET ALONG WITH OTHER PEOPLE: NOT DIFFICULT AT ALL
SUM OF ALL RESPONSES TO PHQ QUESTIONS 1-9: 0
9. THOUGHTS THAT YOU WOULD BE BETTER OFF DEAD, OR OF HURTING YOURSELF: NOT AT ALL
10. IF YOU CHECKED OFF ANY PROBLEMS, HOW DIFFICULT HAVE THESE PROBLEMS MADE IT FOR YOU TO DO YOUR WORK, TAKE CARE OF THINGS AT HOME, OR GET ALONG WITH OTHER PEOPLE: NOT DIFFICULT AT ALL
6. FEELING BAD ABOUT YOURSELF - OR THAT YOU ARE A FAILURE OR HAVE LET YOURSELF OR YOUR FAMILY DOWN: NOT AT ALL
1. LITTLE INTEREST OR PLEASURE IN DOING THINGS: NOT AT ALL
1. LITTLE INTEREST OR PLEASURE IN DOING THINGS: NOT AT ALL
7. TROUBLE CONCENTRATING ON THINGS, SUCH AS READING THE NEWSPAPER OR WATCHING TELEVISION: NOT AT ALL
4. FEELING TIRED OR HAVING LITTLE ENERGY: NOT AT ALL
3. TROUBLE FALLING OR STAYING ASLEEP OR SLEEPING TOO MUCH: NOT AT ALL
4. FEELING TIRED OR HAVING LITTLE ENERGY: NOT AT ALL
7. TROUBLE CONCENTRATING ON THINGS, SUCH AS READING THE NEWSPAPER OR WATCHING TELEVISION: NOT AT ALL

## 2025-08-04 ASSESSMENT — ANXIETY QUESTIONNAIRES
3. WORRYING TOO MUCH ABOUT DIFFERENT THINGS: NOT AT ALL
2. NOT BEING ABLE TO STOP OR CONTROL WORRYING: NOT AT ALL
IF YOU CHECKED OFF ANY PROBLEMS ON THIS QUESTIONNAIRE, HOW DIFFICULT HAVE THESE PROBLEMS MADE IT FOR YOU TO DO YOUR WORK, TAKE CARE OF THINGS AT HOME, OR GET ALONG WITH OTHER PEOPLE: NOT DIFFICULT AT ALL
5. BEING SO RESTLESS THAT IT IS HARD TO SIT STILL: NOT AT ALL
1. FEELING NERVOUS, ANXIOUS, OR ON EDGE: NOT AT ALL
6. BECOMING EASILY ANNOYED OR IRRITABLE: NOT AT ALL
5. BEING SO RESTLESS THAT IT IS HARD TO SIT STILL: NOT AT ALL
IF YOU CHECKED OFF ANY PROBLEMS ON THIS QUESTIONNAIRE, HOW DIFFICULT HAVE THESE PROBLEMS MADE IT FOR YOU TO DO YOUR WORK, TAKE CARE OF THINGS AT HOME, OR GET ALONG WITH OTHER PEOPLE: NOT DIFFICULT AT ALL
1. FEELING NERVOUS, ANXIOUS, OR ON EDGE: NOT AT ALL
4. TROUBLE RELAXING: NOT AT ALL
4. TROUBLE RELAXING: NOT AT ALL
6. BECOMING EASILY ANNOYED OR IRRITABLE: NOT AT ALL
3. WORRYING TOO MUCH ABOUT DIFFERENT THINGS: NOT AT ALL
GAD7 TOTAL SCORE: 0
2. NOT BEING ABLE TO STOP OR CONTROL WORRYING: NOT AT ALL
7. FEELING AFRAID AS IF SOMETHING AWFUL MIGHT HAPPEN: NOT AT ALL
7. FEELING AFRAID AS IF SOMETHING AWFUL MIGHT HAPPEN: NOT AT ALL

## 2025-08-04 NOTE — PROGRESS NOTES
Subjective   History was provided by Yashira.  Yashira Ruiz is a 20 y.o. female who is here for this well visit.    Current Issues:  Current concerns: Discussed IUD, vaginismus, lower bilateral side abdominal pain with running, mood - good, feet - can bother her in the car.  Lower lateral abdominal pain with running.  She has had an abdominal US and vaginal US and pelvic US - no source found on those.    Vision or hearing concerns? no  Dental care up to date? Yes- brushes teeth 2 times/day, regular dental visits, does floss teeth   No significant recent health issues.   Specialist visits - none    Review of Nutrition, Elimination, and Sleep:  Dietary: adequate calcium and vitamin D, adequate fruits and vegetables, adequate protein, limited juice intake and no other sweetened beverages  Elimination: normal bowel movement frequency, normal consistency   Sleep: has structured bedtime routine, sleeps through the night, no trouble getting up    School and Social Screening:  School: will be senior at [employer]  Supportive social network. Current relationship - yes - will be getting     Sports Participation Screening:  Gets regular exercise.    Screening Questions:  Other: normal mood, satisfied with body weight  Risk factors for dyslipidemia: no  Risk factors for sexually-transmitted infections:   Sexually active: yes - in relationship   Using condoms: Yes  Substance use:  Smoking - No  Vaping - No  Drinking - Yes  Drugs - No  Genitourinary:  normal menses - getting an IUD.  Gets cramps first 2 days.  Has ovarian cysts.  Still is very uncomfortable with vaginal sensation.  It takes a lot to put a tampon in.  Was previously referred to PT for pelvic floor therapy but she is too uncomfortable to go to that.      Patient Health Questionnaire-9 Score: (Patient-Rptd) 0     RAUL-7 Total Score: (Patient-Rptd) 0 (8/4/2025  2:28 PM)   No results found.  Objective   /62 (BP Location: Right arm, Patient Position: Sitting)   " Pulse 64   Ht 1.638 m (5' 4.5\")   Wt 51.1 kg (112 lb 9.6 oz)   BMI 19.03 kg/m²   Growth parameters are noted and are appropriate for age.    Physical Exam  Constitutional:       Appearance: Normal appearance.   HENT:      Right Ear: Tympanic membrane normal.      Left Ear: Tympanic membrane normal.      Nose: Nose normal.      Mouth/Throat:      Mouth: Mucous membranes are moist.      Pharynx: Oropharynx is clear.     Eyes:      Extraocular Movements: Extraocular movements intact.       Cardiovascular:      Rate and Rhythm: Normal rate and regular rhythm.      Pulses:           Femoral pulses are 2+ on the right side and 2+ on the left side.     Heart sounds: No murmur heard.  Pulmonary:      Effort: Pulmonary effort is normal.      Breath sounds: Normal breath sounds.   Chest:   Breasts:     Edward Score is 5.      Breasts are symmetrical.   Abdominal:      General: Abdomen is flat.      Palpations: Abdomen is soft. There is no hepatomegaly, splenomegaly or mass.   Genitourinary:     Comments: Pt declines exam    Musculoskeletal:         General: Normal range of motion.      Cervical back: Normal range of motion and neck supple.      Thoracic back: No scoliosis.      Lumbar back: No scoliosis.   Lymphadenopathy:      Cervical: No cervical adenopathy.     Skin:     General: Skin is warm.      Findings: No acne.     Neurological:      General: No focal deficit present.      Mental Status: She is alert.      Deep Tendon Reflexes:      Reflex Scores:       Patellar reflexes are 2+ on the right side and 2+ on the left side.    Psychiatric:         Mood and Affect: Mood normal.         Behavior: Behavior normal.         Assessment/Plan   Yashira was seen today for well child.  Diagnoses and all orders for this visit:  Well adult exam (Primary)  Screening procedure  -     C. trachomatis / N. gonorrhoeae, Amplified, Urogenital  Allergy to environmental factors  Vaginismus  Complaint of pelvic pain  -     Referral to " Pediatric Sports Medicine; Future  Other orders  -     1 Year Follow Up; Future    Well young adult.  - Anticipatory guidance discussed.   - Injury prevention: wearing seatbelt, understanding sun protection, understanding conflict resolution/violence prevention,  reviewed driving safety    -Risk Taking: cardiac risk factors reviewed, alcohol, drug and tobacco use reviewed, reviewed internet safety      - Growth and weight gain appropriate. The patient was counseled regarding nutrition and physical activity.  - Development: appropriate for age  - Get in the habit of monthly breast exams.    - No Immunizations   - Follow up in 1 year for next well child exam or sooner with concerns.    Problem List Items Addressed This Visit       Allergy to environmental factors    Really bad this year; Using oral medications but not nasal steroids - encouraged. Discussed environmental measures, especially in the bedroom. Discussed role of allergy immunotherapy.           Vaginismus    Encouraged pelvic floor therapy. Discuss with Gyn.          Complaint of pelvic pain    Referred to Sports Medicine at [facility]         Relevant Orders    Referral to Pediatric Sports Medicine     Other Visit Diagnoses         Well adult exam    -  Primary      Screening procedure        Relevant Orders    C. trachomatis / N. gonorrhoeae, Amplified, Urogenital

## 2025-08-05 ENCOUNTER — APPOINTMENT (OUTPATIENT)
Dept: OBSTETRICS AND GYNECOLOGY | Facility: HOSPITAL | Age: 20
End: 2025-08-05
Payer: COMMERCIAL

## 2025-08-06 LAB
C TRACH RRNA SPEC QL NAA+PROBE: NOT DETECTED
N GONORRHOEA RRNA SPEC QL NAA+PROBE: NOT DETECTED
QUEST GC CT AMPLIFIED (ALWAYS MESSAGE): NORMAL

## 2025-09-23 ENCOUNTER — APPOINTMENT (OUTPATIENT)
Dept: OBSTETRICS AND GYNECOLOGY | Facility: HOSPITAL | Age: 20
End: 2025-09-23
Payer: COMMERCIAL